# Patient Record
Sex: FEMALE | Race: WHITE | Employment: UNEMPLOYED | URBAN - METROPOLITAN AREA
[De-identification: names, ages, dates, MRNs, and addresses within clinical notes are randomized per-mention and may not be internally consistent; named-entity substitution may affect disease eponyms.]

---

## 2017-09-05 PROCEDURE — 99285 EMERGENCY DEPT VISIT HI MDM: CPT

## 2017-09-05 PROCEDURE — 96361 HYDRATE IV INFUSION ADD-ON: CPT

## 2017-09-05 PROCEDURE — 96360 HYDRATION IV INFUSION INIT: CPT

## 2017-09-06 ENCOUNTER — APPOINTMENT (OUTPATIENT)
Dept: CT IMAGING | Age: 22
End: 2017-09-06
Attending: EMERGENCY MEDICINE
Payer: COMMERCIAL

## 2017-09-06 ENCOUNTER — HOSPITAL ENCOUNTER (EMERGENCY)
Age: 22
Discharge: HOME | End: 2017-09-06
Attending: EMERGENCY MEDICINE | Admitting: EMERGENCY MEDICINE
Payer: COMMERCIAL

## 2017-09-06 VITALS
HEIGHT: 60 IN | RESPIRATION RATE: 18 BRPM | HEART RATE: 90 BPM | SYSTOLIC BLOOD PRESSURE: 113 MMHG | OXYGEN SATURATION: 97 % | TEMPERATURE: 98 F | BODY MASS INDEX: 45.27 KG/M2 | DIASTOLIC BLOOD PRESSURE: 67 MMHG | WEIGHT: 230.6 LBS

## 2017-09-06 DIAGNOSIS — R10.2 ABDOMINAL PAIN, SUPRAPUBIC: Primary | ICD-10-CM

## 2017-09-06 LAB
ALBUMIN SERPL-MCNC: 3.1 G/DL (ref 3.5–5)
ALBUMIN/GLOB SERPL: 0.8 {RATIO} (ref 1.1–2.2)
ALP SERPL-CCNC: 75 U/L (ref 45–117)
ALT SERPL-CCNC: 14 U/L (ref 12–78)
ANION GAP SERPL CALC-SCNC: 6 MMOL/L (ref 5–15)
APPEARANCE UR: CLEAR
AST SERPL-CCNC: 10 U/L (ref 15–37)
BACTERIA URNS QL MICRO: NEGATIVE /HPF
BASOPHILS # BLD: 0 K/UL (ref 0–0.1)
BASOPHILS NFR BLD: 0 % (ref 0–1)
BILIRUB SERPL-MCNC: 0.4 MG/DL (ref 0.2–1)
BILIRUB UR QL: NEGATIVE
BUN SERPL-MCNC: 9 MG/DL (ref 6–20)
BUN/CREAT SERPL: 15 (ref 12–20)
CALCIUM SERPL-MCNC: 8.3 MG/DL (ref 8.5–10.1)
CAOX CRY URNS QL MICRO: ABNORMAL
CHLORIDE SERPL-SCNC: 106 MMOL/L (ref 97–108)
CLUE CELLS VAG QL WET PREP: NORMAL
CO2 SERPL-SCNC: 24 MMOL/L (ref 21–32)
COLOR UR: ABNORMAL
CREAT SERPL-MCNC: 0.62 MG/DL (ref 0.55–1.02)
EOSINOPHIL # BLD: 0.2 K/UL (ref 0–0.4)
EOSINOPHIL NFR BLD: 1 % (ref 0–7)
EPITH CASTS URNS QL MICRO: ABNORMAL /LPF
ERYTHROCYTE [DISTWIDTH] IN BLOOD BY AUTOMATED COUNT: 14.1 % (ref 11.5–14.5)
GLOBULIN SER CALC-MCNC: 3.8 G/DL (ref 2–4)
GLUCOSE SERPL-MCNC: 88 MG/DL (ref 65–100)
GLUCOSE UR STRIP.AUTO-MCNC: NEGATIVE MG/DL
HCG UR QL: NEGATIVE
HCT VFR BLD AUTO: 39.2 % (ref 35–47)
HGB BLD-MCNC: 13.2 G/DL (ref 11.5–16)
HGB UR QL STRIP: NEGATIVE
HYALINE CASTS URNS QL MICRO: ABNORMAL /LPF (ref 0–5)
KETONES UR QL STRIP.AUTO: NEGATIVE MG/DL
KOH PREP SPEC: NORMAL
LEUKOCYTE ESTERASE UR QL STRIP.AUTO: NEGATIVE
LYMPHOCYTES # BLD: 3 K/UL (ref 0.8–3.5)
LYMPHOCYTES NFR BLD: 22 % (ref 12–49)
MCH RBC QN AUTO: 28.5 PG (ref 26–34)
MCHC RBC AUTO-ENTMCNC: 33.7 G/DL (ref 30–36.5)
MCV RBC AUTO: 84.7 FL (ref 80–99)
MONOCYTES # BLD: 0.9 K/UL (ref 0–1)
MONOCYTES NFR BLD: 6 % (ref 5–13)
NEUTS SEG # BLD: 9.5 K/UL (ref 1.8–8)
NEUTS SEG NFR BLD: 71 % (ref 32–75)
NITRITE UR QL STRIP.AUTO: NEGATIVE
PH UR STRIP: 5.5 [PH] (ref 5–8)
PLATELET # BLD AUTO: 298 K/UL (ref 150–400)
POTASSIUM SERPL-SCNC: 3.5 MMOL/L (ref 3.5–5.1)
PROT SERPL-MCNC: 6.9 G/DL (ref 6.4–8.2)
PROT UR STRIP-MCNC: NEGATIVE MG/DL
RBC # BLD AUTO: 4.63 M/UL (ref 3.8–5.2)
RBC #/AREA URNS HPF: ABNORMAL /HPF (ref 0–5)
SERVICE CMNT-IMP: NORMAL
SODIUM SERPL-SCNC: 136 MMOL/L (ref 136–145)
SP GR UR REFRACTOMETRY: 1.03 (ref 1–1.03)
T VAGINALIS VAG QL WET PREP: NORMAL
UA: UC IF INDICATED,UAUC: ABNORMAL
UROBILINOGEN UR QL STRIP.AUTO: 0.2 EU/DL (ref 0.2–1)
WBC # BLD AUTO: 13.6 K/UL (ref 3.6–11)
WBC URNS QL MICRO: ABNORMAL /HPF (ref 0–4)

## 2017-09-06 PROCEDURE — 36415 COLL VENOUS BLD VENIPUNCTURE: CPT | Performed by: EMERGENCY MEDICINE

## 2017-09-06 PROCEDURE — 81001 URINALYSIS AUTO W/SCOPE: CPT | Performed by: EMERGENCY MEDICINE

## 2017-09-06 PROCEDURE — 87491 CHLMYD TRACH DNA AMP PROBE: CPT | Performed by: EMERGENCY MEDICINE

## 2017-09-06 PROCEDURE — 74177 CT ABD & PELVIS W/CONTRAST: CPT

## 2017-09-06 PROCEDURE — 87210 SMEAR WET MOUNT SALINE/INK: CPT | Performed by: EMERGENCY MEDICINE

## 2017-09-06 PROCEDURE — 74011636320 HC RX REV CODE- 636/320: Performed by: EMERGENCY MEDICINE

## 2017-09-06 PROCEDURE — 80053 COMPREHEN METABOLIC PANEL: CPT | Performed by: EMERGENCY MEDICINE

## 2017-09-06 PROCEDURE — 81025 URINE PREGNANCY TEST: CPT | Performed by: EMERGENCY MEDICINE

## 2017-09-06 PROCEDURE — 74011250636 HC RX REV CODE- 250/636: Performed by: EMERGENCY MEDICINE

## 2017-09-06 PROCEDURE — 85025 COMPLETE CBC W/AUTO DIFF WBC: CPT | Performed by: EMERGENCY MEDICINE

## 2017-09-06 RX ORDER — HYDROCODONE BITARTRATE AND ACETAMINOPHEN 5; 325 MG/1; MG/1
1 TABLET ORAL
Qty: 20 TAB | Refills: 0 | Status: SHIPPED | OUTPATIENT
Start: 2017-09-06 | End: 2019-05-22

## 2017-09-06 RX ORDER — SODIUM CHLORIDE 0.9 % (FLUSH) 0.9 %
5-10 SYRINGE (ML) INJECTION EVERY 8 HOURS
Status: DISCONTINUED | OUTPATIENT
Start: 2017-09-06 | End: 2017-09-06 | Stop reason: HOSPADM

## 2017-09-06 RX ORDER — SODIUM CHLORIDE 0.9 % (FLUSH) 0.9 %
10 SYRINGE (ML) INJECTION
Status: COMPLETED | OUTPATIENT
Start: 2017-09-06 | End: 2017-09-06

## 2017-09-06 RX ORDER — SODIUM CHLORIDE 9 MG/ML
50 INJECTION, SOLUTION INTRAVENOUS
Status: COMPLETED | OUTPATIENT
Start: 2017-09-06 | End: 2017-09-06

## 2017-09-06 RX ORDER — ONDANSETRON 4 MG/1
4 TABLET, ORALLY DISINTEGRATING ORAL
Qty: 10 TAB | Refills: 0 | Status: SHIPPED | OUTPATIENT
Start: 2017-09-06 | End: 2019-05-22

## 2017-09-06 RX ORDER — SODIUM CHLORIDE 0.9 % (FLUSH) 0.9 %
5-10 SYRINGE (ML) INJECTION AS NEEDED
Status: DISCONTINUED | OUTPATIENT
Start: 2017-09-06 | End: 2017-09-06 | Stop reason: HOSPADM

## 2017-09-06 RX ADMIN — SODIUM CHLORIDE 1000 ML: 900 INJECTION, SOLUTION INTRAVENOUS at 02:50

## 2017-09-06 RX ADMIN — Medication 10 ML: at 03:50

## 2017-09-06 RX ADMIN — Medication 10 ML: at 03:25

## 2017-09-06 RX ADMIN — IOPAMIDOL 100 ML: 755 INJECTION, SOLUTION INTRAVENOUS at 03:51

## 2017-09-06 RX ADMIN — SODIUM CHLORIDE 50 ML/HR: 900 INJECTION, SOLUTION INTRAVENOUS at 03:50

## 2017-09-06 NOTE — ED NOTES
Discharge instructions reviewed with pt and copy given along with RX by ER MD Elise Torres. Patient ambulatory from ED accompanied by parent in no sign of distress or discomfort.

## 2017-09-06 NOTE — ED TRIAGE NOTES
Patient arrives ambulatory to ED with complaint of lower abdominal/pelvic pain/cramping that started around 10pm last night, \"constant, sharp pain\" that has eased off. Patient denies N/V/D.

## 2017-09-06 NOTE — ED PROVIDER NOTES
HPI Comments: Kvng Salazar is a 25 y.o. female, pmhx migraines / anxiety / depression, who presents ambulatory to the ED c/o sudden onset suprapubic abd pain x 2200 this evening. Pt reports additional vaginal discharge and states she was nauseated over the last 2 days. She notes her LNMP was 8/14/2017. Pt states her last BM was earlier this morning and reportedly normal. She denies any hx of similar sxs. Pt denies any recent medications for her current sxs. She specifically denies any recent fever, chills, vomiting, diarrhea, CP, SOB, lightheadedness, dizziness, numbness, weakness, tingling, BLE swelling, HA, heart palpitations, urinary sxs, changes in BM, changes in PO intake, melena, hematochezia, cough, or congestion. PCP: Deleta Prader  OB/GYN: [facility])    Allergies: NKDA  PMHx: Significant for migraine, anxiety, depression, hypothyroidism, asthma  PSHx: Significant for D&C  Social Hx: +tobacco (1 ppd), +EtOH (occasionally), -Illicit Drugs    There are no other complaints, changes, or physical findings at this time. The history is provided by the patient. Past Medical History:   Diagnosis Date    Anxiety and depression     Asthma     H/O seasonal allergies     History of migraine     Hypothyroidism     Dr Shirley Watt       Past Surgical History:   Procedure Laterality Date   Reina Guzman GYN  07/2015    D & C         History reviewed. No pertinent family history. Social History     Social History    Marital status: SINGLE     Spouse name: N/A    Number of children: N/A    Years of education: N/A     Occupational History    Not on file.      Social History Main Topics    Smoking status: Current Every Day Smoker     Packs/day: 1.00     Years: 3.00    Smokeless tobacco: Never Used    Alcohol use Yes      Comment: occassional    Drug use: No    Sexual activity: Not on file     Other Topics Concern    Not on file     Social History Narrative         ALLERGIES: Review of patient's allergies indicates no known allergies. Review of Systems   Constitutional: Negative for chills and fever. HENT: Negative for congestion, ear pain, rhinorrhea and sore throat. Respiratory: Negative for cough and shortness of breath. Cardiovascular: Negative for chest pain, palpitations and leg swelling. Gastrointestinal: Positive for abdominal pain and nausea. Negative for constipation, diarrhea and vomiting. No melena  No hematochezia   Endocrine: Negative for polyuria. Genitourinary: Positive for vaginal discharge. Negative for dysuria, frequency and hematuria. Neurological: Negative for dizziness, weakness, light-headedness, numbness and headaches. No tingling   All other systems reviewed and are negative. Vitals:    09/06/17 0215 09/06/17 0230 09/06/17 0245 09/06/17 0330   BP: 126/78 126/80 108/71 113/67   Pulse:       Resp:       Temp:       SpO2: 94% 98% 95% 97%   Weight:       Height:                Physical Exam   Nursing note and vitals reviewed.   General appearance - obese, well appearing, and in no distress  Eyes - pupils equal and reactive, extraocular eye movements intact  ENT - mucous membranes moist, pharynx normal without lesions  Neck - supple, no significant adenopathy; non-tender to palpation  Chest - clear to auscultation, no wheezes, rales or rhonchi; non-tender to palpation  Heart - normal rate and regular rhythm, S1 and S2 normal, no murmurs noted  Abdomen - soft, suprapubic tenderness to palpation, nondistended, no masses or organomegaly  Musculoskeletal - no joint tenderness, deformity or swelling; normal ROM  Extremities - peripheral pulses normal, no pedal edema  Skin - normal coloration and turgor, no rashes  Neurological - alert, oriented x3, normal speech, no focal findings or movement disorder noted   - uterine tenderness to palpation, otherwise normal pelvic exam  Written by MONTY Stacy, as dictated by April N Eryn Maria MD      [facility]  Number of Diagnoses or Management Options  Diagnosis management comments:   DDx: UTI, pyelonephritis, PID       Amount and/or Complexity of Data Reviewed  Clinical lab tests: ordered and reviewed  Tests in the radiology section of CPT®: reviewed and ordered  Review and summarize past medical records: yes  Independent visualization of images, tracings, or specimens: yes    Patient Progress  Patient progress: stable      Procedures    Procedure Note - Pelvic Exam:    2:28 AM  Performed by: Aracelis Ahmadi MD  Chaperoned by: Henry Fracnois RN  Pelvic exam was performed using bimanual and speculum. Further findings noted in physical exam.   The procedure took 1-15 minutes, and pt tolerated well. Written by Geovanni Aj, ED Scribe, as dictated by Patsy Esparza MD    Progress note:  5:02 AM  Pt noted to be feeling better, she states her pain is significantly improved, ready for discharge. Updated pt and/or family on all final lab and imaging findings. Will follow up as instructed. All questions have been answered, pt voiced understanding and agreement with plan. Narcotics were prescribed, pt was advised not to drive or operate heavy machinery. Pt defers empiric treatment for GC / Chlamydia at this time. Specific return precautions provided as well as instructions to return to the ED should sx worsen at any time. Vital signs stable for discharge.    Written by Geovanni Aj, ED Scribe, as dictated by Patsy Heard MD    LABORATORY TESTS:  Recent Results (from the past 12 hour(s))   URINALYSIS W/ REFLEX CULTURE    Collection Time: 09/06/17 12:06 AM   Result Value Ref Range    Color YELLOW/STRAW      Appearance CLEAR CLEAR      Specific gravity 1.026 1.003 - 1.030      pH (UA) 5.5 5.0 - 8.0      Protein NEGATIVE  NEG mg/dL    Glucose NEGATIVE  NEG mg/dL    Ketone NEGATIVE  NEG mg/dL    Bilirubin NEGATIVE  NEG      Blood NEGATIVE  NEG      Urobilinogen 0.2 0.2 - 1.0 EU/dL    Nitrites NEGATIVE  NEG      Leukocyte Esterase NEGATIVE  NEG      WBC 0-4 0 - 4 /hpf    RBC 0-5 0 - 5 /hpf    Epithelial cells FEW FEW /lpf    Bacteria NEGATIVE  NEG /hpf    UA:UC IF INDICATED CULTURE NOT INDICATED BY UA RESULT CNI      CA Oxalate crystals FEW (A) NEG      Hyaline cast 0-2 0 - 5 /lpf   HCG URINE, QL    Collection Time: 09/06/17 12:06 AM   Result Value Ref Range    HCG urine, Ql. NEGATIVE  NEG     CBC WITH AUTOMATED DIFF    Collection Time: 09/06/17  2:18 AM   Result Value Ref Range    WBC 13.6 (H) 3.6 - 11.0 K/uL    RBC 4.63 3.80 - 5.20 M/uL    HGB 13.2 11.5 - 16.0 g/dL    HCT 39.2 35.0 - 47.0 %    MCV 84.7 80.0 - 99.0 FL    MCH 28.5 26.0 - 34.0 PG    MCHC 33.7 30.0 - 36.5 g/dL    RDW 14.1 11.5 - 14.5 %    PLATELET 398 447 - 494 K/uL    NEUTROPHILS 71 32 - 75 %    LYMPHOCYTES 22 12 - 49 %    MONOCYTES 6 5 - 13 %    EOSINOPHILS 1 0 - 7 %    BASOPHILS 0 0 - 1 %    ABS. NEUTROPHILS 9.5 (H) 1.8 - 8.0 K/UL    ABS. LYMPHOCYTES 3.0 0.8 - 3.5 K/UL    ABS. MONOCYTES 0.9 0.0 - 1.0 K/UL    ABS. EOSINOPHILS 0.2 0.0 - 0.4 K/UL    ABS. BASOPHILS 0.0 0.0 - 0.1 K/UL   METABOLIC PANEL, COMPREHENSIVE    Collection Time: 09/06/17  2:18 AM   Result Value Ref Range    Sodium 136 136 - 145 mmol/L    Potassium 3.5 3.5 - 5.1 mmol/L    Chloride 106 97 - 108 mmol/L    CO2 24 21 - 32 mmol/L    Anion gap 6 5 - 15 mmol/L    Glucose 88 65 - 100 mg/dL    BUN 9 6 - 20 MG/DL    Creatinine 0.62 0.55 - 1.02 MG/DL    BUN/Creatinine ratio 15 12 - 20      GFR est AA >60 >60 ml/min/1.73m2    GFR est non-AA >60 >60 ml/min/1.73m2    Calcium 8.3 (L) 8.5 - 10.1 MG/DL    Bilirubin, total 0.4 0.2 - 1.0 MG/DL    ALT (SGPT) 14 12 - 78 U/L    AST (SGOT) 10 (L) 15 - 37 U/L    Alk.  phosphatase 75 45 - 117 U/L    Protein, total 6.9 6.4 - 8.2 g/dL    Albumin 3.1 (L) 3.5 - 5.0 g/dL    Globulin 3.8 2.0 - 4.0 g/dL    A-G Ratio 0.8 (L) 1.1 - 2.2     KOH, OTHER SOURCES    Collection Time: 09/06/17  2:18 AM   Result Value Ref Range    Special Requests: NO SPECIAL REQUESTS      KOH NO YEAST SEEN     WET PREP    Collection Time: 09/06/17  2:18 AM   Result Value Ref Range    Clue cells CLUE CELLS ABSENT      Wet prep NO TRICHOMONAS SEEN         IMAGING RESULTS:     CT Results  (Last 48 hours)               09/06/17 0400  CT ABD PELV W CONT Final result    Impression:  IMPRESSION:   No acute intraperitoneal process. Narrative:  EXAM:  CT ABD PELV W CONT   Clinical history: Lower abdominal pain, increased vaginal discharge, nausea for   the past 2 days   INDICATION: lower abd pain  suprapubic pain, history of hypothyroidism and   migraines       COMPARISON: 9/13/2015       CONTRAST:  100 mL of Isovue-370. TECHNIQUE:    Following the uneventful intravenous administration of contrast, thin axial   images were obtained through the abdomen and pelvis. Coronal and sagittal   reconstructions were generated. Oral contrast was not administered. CT dose   reduction was achieved through use of a standardized protocol tailored for this   examination and automatic exposure control for dose modulation. FINDINGS:    LUNG BASES: Clear. INCIDENTALLY IMAGED HEART AND MEDIASTINUM: Unremarkable. LIVER: No mass or biliary dilatation. GALLBLADDER: Unremarkable. SPLEEN: No mass. PANCREAS: No mass or ductal dilatation. ADRENALS: Unremarkable. KIDNEYS: No mass, calculus, or hydronephrosis. STOMACH: Unremarkable. SMALL BOWEL: No dilatation or wall thickening. COLON: Few colonic diverticula. No diverticulitis   APPENDIX: Unremarkable. PERITONEUM: No ascites or pneumoperitoneum. RETROPERITONEUM: No lymphadenopathy or aortic aneurysm. REPRODUCTIVE ORGANS: Within normal limits   URINARY BLADDER: No mass or calculus. BONES: No destructive bone lesion.    ADDITIONAL COMMENTS: N/A                    MEDICATIONS GIVEN:  Medications   sodium chloride (NS) flush 5-10 mL (10 mL IntraVENous Given 9/6/17 0325)   sodium chloride (NS) flush 5-10 mL (not administered) sodium chloride 0.9 % bolus infusion 1,000 mL (1,000 mL IntraVENous New Bag 9/6/17 0250)   sodium chloride (NS) flush 10 mL (10 mL IntraVENous Given 9/6/17 0350)   iopamidol (ISOVUE-370) 76 % injection 100 mL (100 mL IntraVENous Given 9/6/17 0351)   0.9% sodium chloride infusion (50 mL/hr IntraVENous New Bag 9/6/17 0350)       IMPRESSION:  1. Abdominal pain, suprapubic        PLAN:  1. Current Discharge Medication List      START taking these medications    Details   HYDROcodone-acetaminophen (NORCO) 5-325 mg per tablet Take 1 Tab by mouth every four (4) hours as needed for Pain. Max Daily Amount: 6 Tabs. Qty: 20 Tab, Refills: 0      ondansetron (ZOFRAN ODT) 4 mg disintegrating tablet Take 1 Tab by mouth every eight (8) hours as needed for Nausea. Qty: 10 Tab, Refills: 0           2. Follow-up Information     Follow up With Details Comments Contact Info    [facility] EMERGENCY DEPT  If symptoms worsen [address] [address]  [address] In 2 days  [facility]  Hakeem García  [phone]          Return to ED if worse     DISCHARGE NOTE:  5:02 AM  The patient's results have been reviewed with family and/or caregiver. They verbally convey their understanding and agreement of the patient's signs, symptoms, diagnosis, treatment, and prognosis. They additionally agree to follow up as recommended in the discharge instructions or to return to the Emergency Room should the patient's condition change prior to their follow-up appointment. The family and/or caregiver verbally agrees with the care-plan and all of their questions have been answered. The discharge instructions have also been provided to the them along with educational information regarding the patient's diagnosis and a list of reasons why the patient would want to return to the ER prior to their follow-up appointment should their condition change.   Written by MONTY Prescott, as dictated by April Roberto Gonzales MD.          This note is prepared by Marshal Piper, acting as Scribe for MD Patsy Talbot MD : The scribe's documentation has been prepared under my direction and personally reviewed by me in its entirety. I confirm that the note above accurately reflects all work, treatment, procedures, and medical decision making performed by me. This note will not be viewable in [address]

## 2017-09-06 NOTE — DISCHARGE INSTRUCTIONS
Pelvic Pain: Care Instructions  Your Care Instructions    Pelvic pain, or pain in the lower belly, can have many causes. Often pelvic pain is not serious and gets better in a few days. If your pain continues or gets worse, you may need tests and treatment. Tell your doctor about any new symptoms. These may be signs of a serious problem. Follow-up care is a key part of your treatment and safety. Be sure to make and go to all appointments, and call your doctor if you are having problems. It's also a good idea to know your test results and keep a list of the medicines you take. How can you care for yourself at home? · Rest until you feel better. Lie down, and raise your legs by placing a pillow under your knees. · Drink plenty of fluids. You may find that small, frequent sips are easier on your stomach than if you drink a lot at once. Avoid drinks with carbonation or caffeine, such as soda pop, tea, or coffee. · Try eating several small meals instead of 2 or 3 large ones. Eat mild foods, such as rice, dry toast or crackers, bananas, and applesauce. Avoid fatty and spicy foods, other fruits, and alcohol until 48 hours after your symptoms have gone away. · Take an over-the-counter pain medicine, such as acetaminophen (Tylenol), ibuprofen (Advil, Motrin), or naproxen (Aleve). Read and follow all instructions on the label. · Do not take two or more pain medicines at the same time unless the doctor told you to. Many pain medicines have acetaminophen, which is Tylenol. Too much acetaminophen (Tylenol) can be harmful. · You can put a heating pad, a warm cloth, or moist heat on your belly to relieve pain. When should you call for help? Call 911 anytime you think you may need emergency care. For example, call if:  · You passed out (lost consciousness). Call your doctor now or seek immediate medical care if:  · Your pain is getting worse. · Your pain becomes focused in one area of your belly.   · You have severe vaginal bleeding. Severe means that you are soaking through your usual pads or tampons every hour for 2 or more hours and passing clots of blood. · You have new symptoms such as fever, urinary problems or unexpected vaginal bleeding. · You are dizzy or lightheaded, or you feel like you may faint. Watch closely for changes in your health, and be sure to contact your doctor if:  · You do not get better as expected. Where can you learn more? Go to http://sofia-skye.info/. Enter [id number] in the search box to learn more about \"Pelvic Pain: Care Instructions. \"  Current as of: October 13, 2016  Content Version: 11.3  © 6120-8528 [employer]. Care instructions adapted under license by [employer] (which disclaims liability or warranty for this information). If you have questions about a medical condition or this instruction, always ask your healthcare professional. [address] any warranty or liability for your use of this information.

## 2017-09-08 ENCOUNTER — HOSPITAL ENCOUNTER (EMERGENCY)
Age: 22
Discharge: HOME | End: 2017-09-08
Attending: EMERGENCY MEDICINE | Admitting: EMERGENCY MEDICINE
Payer: COMMERCIAL

## 2017-09-08 VITALS
RESPIRATION RATE: 18 BRPM | BODY MASS INDEX: 45.27 KG/M2 | SYSTOLIC BLOOD PRESSURE: 150 MMHG | HEIGHT: 60 IN | WEIGHT: 230.6 LBS | TEMPERATURE: 98.3 F | HEART RATE: 93 BPM | OXYGEN SATURATION: 97 % | DIASTOLIC BLOOD PRESSURE: 90 MMHG

## 2017-09-08 DIAGNOSIS — N72 CERVICITIS: Primary | ICD-10-CM

## 2017-09-08 LAB
C TRACH DNA SPEC QL NAA+PROBE: POSITIVE
N GONORRHOEA DNA SPEC QL NAA+PROBE: POSITIVE
SAMPLE TYPE: ABNORMAL
SERVICE CMNT-IMP: ABNORMAL
SPECIMEN SOURCE: ABNORMAL

## 2017-09-08 PROCEDURE — 99283 EMERGENCY DEPT VISIT LOW MDM: CPT

## 2017-09-08 PROCEDURE — 74011250637 HC RX REV CODE- 250/637: Performed by: EMERGENCY MEDICINE

## 2017-09-08 PROCEDURE — 96372 THER/PROPH/DIAG INJ SC/IM: CPT

## 2017-09-08 PROCEDURE — 74011000250 HC RX REV CODE- 250: Performed by: PHYSICIAN ASSISTANT

## 2017-09-08 PROCEDURE — 74011250636 HC RX REV CODE- 250/636: Performed by: PHYSICIAN ASSISTANT

## 2017-09-08 RX ORDER — AZITHROMYCIN 250 MG/1
1000 TABLET, FILM COATED ORAL
Status: DISCONTINUED | OUTPATIENT
Start: 2017-09-08 | End: 2017-09-08

## 2017-09-08 RX ORDER — DOXYCYCLINE HYCLATE 100 MG
100 TABLET ORAL
Status: COMPLETED | OUTPATIENT
Start: 2017-09-08 | End: 2017-09-08

## 2017-09-08 RX ORDER — DOXYCYCLINE HYCLATE 100 MG
100 TABLET ORAL 2 TIMES DAILY
Qty: 14 TAB | Refills: 0 | Status: SHIPPED | OUTPATIENT
Start: 2017-09-08 | End: 2017-09-15

## 2017-09-08 RX ADMIN — LIDOCAINE HYDROCHLORIDE 250 MG: 10 INJECTION, SOLUTION EPIDURAL; INFILTRATION; INTRACAUDAL; PERINEURAL at 21:22

## 2017-09-08 RX ADMIN — DOXYCYCLINE HYCLATE 100 MG: 100 TABLET, COATED ORAL at 21:21

## 2017-09-08 NOTE — PROGRESS NOTES
Provider contacted patient, verified . Discussed culture results. Patient was not treated in ED, advised to return to ED for treatment. Also advised to notify sexual partner(s) so that they may be treated. Follow up in 1-2 weeks with gyn or health dept to be retested to assure resolution.    Missy Ho

## 2017-09-09 NOTE — DISCHARGE INSTRUCTIONS
Cervicitis: Care Instructions  Your Care Instructions    Cervicitis means that your cervix is inflamed. The cervix is the part of your uterus that opens into your vagina. This problem is most often caused by an infection. Some women get it after they have a sexually transmitted infection (STI). These include gonorrhea and chlamydia. It can also be caused by irritation from some types of birth control. Two examples are the cervical cap or diaphragm. Your doctor may do some tests to help find the cause of the problem. It is very important to treat cervicitis. If you don't, you could have serious health problems. For this reason, you may need a test after your treatment to make sure the infection is gone. Follow-up care is a key part of your treatment and safety. Be sure to make and go to all appointments, and call your doctor if you are having problems. It's also a good idea to know your test results and keep a list of the medicines you take. How can you care for yourself at home? · Take your antibiotics as directed. Do not stop taking them just because you feel better. You need to take the full course of antibiotics. · If your doctor prescribed antifungal medicine, use it as directed. · While you are being treated, do not have sex. If your treatment is one dose of antibiotics, wait at least 7 days after you take your medicine before you have any kind of sexual contact. Even if you use a condom, you could get infected again. · It's important to tell your sex partner or partners that you have cervicitis. It may be related to an STI. Any partners should get tested and then treated if they have an STI. This is true even if they don't have symptoms. · Do not douche. It can change the normal balance of substances in your vagina. · Do not use tampons while you are being treated. To prevent STIs  · Use latex condoms every time you have sex. Use them from the start to the end of sexual contact.   · Talk to your partner before you have sex. Find out if he or she has or is at risk for any sexually transmitted infection (STI). Keep in mind that a person may be able to spread an STI even if he or she does not have symptoms. · Do not have sex with anyone who has symptoms of an STI. These include sores on the genitals or mouth. · Having one sex partner (who does not have STIs and does not have sex with anyone else) is a good way to avoid STIs. When should you call for help? Call your doctor now or seek immediate medical care if:  · You have new pelvic pain, or the pain in your pelvis gets worse. · You have a new discharge from your vagina. · You have a new or higher fever. Watch closely for changes in your health, and be sure to contact your doctor if:  · You do not get better as expected. · Your symptoms continue or come back after treatment, or you get new symptoms. Where can you learn more? Go to http://sofia-skye.info/. Enter E405 in the search box to learn more about \"Cervicitis: Care Instructions. \"  Current as of: July 26, 2016  Content Version: 11.3  © 2141-6210 [employer]. Care instructions adapted under license by [employer] (which disclaims liability or warranty for this information). If you have questions about a medical condition or this instruction, always ask your healthcare professional. [address] any warranty or liability for your use of this information.

## 2017-09-09 NOTE — ED PROVIDER NOTES
HPI Comments: Rebeca Khalil is a 25 y.o. female with PMhx significant for anxiety, depression, asthma, hypothyroidism who presents ambulatory to the ED for abnormal lab results. Pt reports that she received a call today regarding her chlamydia and gonorrhea lab results. Per chart review pt was seen at [facility] on 09/06/2017 for suprapubic abdominal pain and vaginal discharge. Per chart pt opted not to be treated at the time of her visit on 09/06/2017. She states that she has had no worsening vaginal discharge or abdominal discomfort since being evaluated on 09/06/2017. Pt denies any dysuria, vaginal bleeding, vaginal pain, abdominal pain, nausea, vomiting, fevers, or chills. Social history significant for: + Tobacco (1ppd), + EtOH, - Illicit drug use    PCP: Michael Almonte    There are no other complaints, changes or physical findings at this time. Written by [employer] [facility] Scribjennifer, as dictated by Raegan Riley DO. The history is provided by the patient. No  was used. Past Medical History:   Diagnosis Date    Anxiety and depression     Asthma     H/O seasonal allergies     History of migraine     Hypothyroidism     Dr Marilin Ruiz       Past Surgical History:   Procedure Laterality Date    HX GYN  07/2015    D & C         No family history on file. Social History     Social History    Marital status: SINGLE     Spouse name: N/A    Number of children: N/A    Years of education: N/A     Occupational History    Not on file. Social History Main Topics    Smoking status: Current Every Day Smoker     Packs/day: 1.00     Years: 3.00    Smokeless tobacco: Never Used    Alcohol use Yes      Comment: occassional    Drug use: No    Sexual activity: Not on file     Other Topics Concern    Not on file     Social History Narrative         ALLERGIES: Review of patient's allergies indicates no known allergies. Review of Systems   Constitutional: Negative.   Negative for appetite change, chills, fatigue and fever. HENT: Negative. Negative for congestion, rhinorrhea, sinus pressure and sore throat. Eyes: Negative. Respiratory: Negative. Negative for cough, choking, chest tightness, shortness of breath and wheezing. Cardiovascular: Negative. Negative for chest pain, palpitations and leg swelling. Gastrointestinal: Negative for abdominal pain, constipation, diarrhea, nausea and vomiting. Endocrine: Negative. Genitourinary: Negative. Negative for difficulty urinating, dysuria, flank pain, urgency, vaginal bleeding, vaginal discharge and vaginal pain. Musculoskeletal: Negative. Skin: Negative. Neurological: Negative. Negative for dizziness, speech difficulty, weakness, light-headedness, numbness and headaches. Psychiatric/Behavioral: Negative. All other systems reviewed and are negative. Patient Vitals for the past 12 hrs:   Temp Pulse Resp BP SpO2   09/08/17 1935 98.1 °F (36.7 °C) 96 18 (!) 153/98 100 %     Physical Exam   Constitutional: She is oriented to person, place, and time. She appears well-developed and well-nourished. No distress. HENT:   Head: Normocephalic and atraumatic. Mouth/Throat: Oropharynx is clear and moist. No oropharyngeal exudate. Eyes: Conjunctivae and EOM are normal. Pupils are equal, round, and reactive to light. Neck: Normal range of motion. Neck supple. No JVD present. No tracheal deviation present. Cardiovascular: Normal rate, regular rhythm, normal heart sounds and intact distal pulses. No murmur heard. Pulmonary/Chest: Effort normal and breath sounds normal. No stridor. No respiratory distress. She has no wheezes. She has no rales. She exhibits no tenderness. Abdominal: Soft. She exhibits no distension. There is no tenderness. There is no rebound and no guarding. Musculoskeletal: Normal range of motion. She exhibits no edema or tenderness.    Neurological: She is alert and oriented to person, place, and time. No cranial nerve deficit. No gross motor or sensory deficits    Skin: Skin is warm and dry. She is not diaphoretic. Psychiatric: She has a normal mood and affect. Her behavior is normal.   Nursing note and vitals reviewed. MDM  Number of Diagnoses or Management Options  Cervicitis:   Diagnosis management comments:   DDx: Cervicitis       Amount and/or Complexity of Data Reviewed  Review and summarize past medical records: yes    Patient Progress  Patient progress: stable    Procedures    MEDICATIONS GIVEN:  Medications   cefTRIAXone (ROCEPHIN) 250 mg in lidocaine (PF) (XYLOCAINE) 10 mg/mL (1 %) IM injection (not administered)   doxycycline (VIBRA-TABS) tablet 100 mg (not administered)       IMPRESSION:  1. Cervicitis        PLAN:  1. Current Discharge Medication List      START taking these medications    Details   doxycycline (VIBRA-TABS) 100 mg tablet Take 1 Tab by mouth two (2) times a day for 7 days. Qty: 14 Tab, Refills: 0           2. Follow-up Information     Follow up With Details [id number] [facility]  As [facility] [id number] [facility]  Hakeem García  [phone]          Return to ED if worse     DISCHARGE NOTE  9:10 PM  The patient has been re-evaluated and is ready for discharge. Reviewed available results with patient. Counseled patient on diagnosis and care plan. Patient has expressed understanding, and all questions have been answered. Patient agrees with plan and agrees to follow up as recommended, or return to the ED if their symptoms worsen. Discharge instructions have been provided and explained to the patient, along with reasons to return to the ED. This note is prepared by Antonella Beard, acting as Scribe for Sujey Bernabe, [facility], DO: The scribe's documentation has been prepared under my direction and personally reviewed by me in its entirety.  I confirm that the note above accurately reflects all work, treatment, procedures, and medical decision making performed by me.

## 2018-01-18 LAB — CREATININE, EXTERNAL: 0.61

## 2019-05-22 ENCOUNTER — OFFICE VISIT (OUTPATIENT)
Dept: ENDOCRINOLOGY | Age: 24
End: 2019-05-22

## 2019-05-22 VITALS
HEIGHT: 60 IN | WEIGHT: 225 LBS | DIASTOLIC BLOOD PRESSURE: 76 MMHG | SYSTOLIC BLOOD PRESSURE: 129 MMHG | BODY MASS INDEX: 44.17 KG/M2 | HEART RATE: 81 BPM

## 2019-05-22 DIAGNOSIS — E06.3 HYPOTHYROIDISM DUE TO HASHIMOTO'S THYROIDITIS: Primary | ICD-10-CM

## 2019-05-22 DIAGNOSIS — E03.8 HYPOTHYROIDISM DUE TO HASHIMOTO'S THYROIDITIS: Primary | ICD-10-CM

## 2019-05-22 DIAGNOSIS — E04.1 THYROID NODULE: ICD-10-CM

## 2019-05-22 RX ORDER — NORGESTIMATE AND ETHINYL ESTRADIOL 0.25-0.035
KIT ORAL
Refills: 0 | COMMUNITY
Start: 2019-05-03

## 2019-05-22 RX ORDER — ERGOCALCIFEROL 1.25 MG/1
50000 CAPSULE ORAL
Refills: 0 | COMMUNITY
Start: 2019-04-14

## 2019-05-22 RX ORDER — AMOXICILLIN AND CLAVULANATE POTASSIUM 875; 125 MG/1; MG/1
TABLET, FILM COATED ORAL
Refills: 0 | COMMUNITY
Start: 2019-05-10

## 2019-05-22 RX ORDER — CITALOPRAM 20 MG/1
TABLET, FILM COATED ORAL DAILY
COMMUNITY

## 2019-05-22 NOTE — PROGRESS NOTES
CONSULTATION REQUESTED BY: Thor Rodriguez MD     REASON FOR CONSULT: Hypothyroidism / thyroid nodule    CHIEF COMPLAINT: Evaluation for hypothyroidism    HISTORY OF PRESENT ILLNESS:   Shaheed Parra is a 22 y.o. female with a PMHx as noted below who was referred to our endocrinology clinic for evaluation of hypothyroidism and thyroid nodule    Hypothyroidism:  Diagnosed with hashimoto thyroiditis around 2016, been seen by Dr. Margaret Anders in the past,  Patient denies history of radiation exposure or trauma/surgery,  Family history of thyroid disease is positive in her mother,   Currently taking 50mcg of generic levothyroxine, prev on 75 mcg,   Had actually stopped taking it around 2017 and then restarted it a few weeks ago,   She just \"jessica quit taking it\",  The patient admits to taking with her other med in the AM and not waiting before eating breakfast.   Reports she was not aware of how to take this medicine correctly. Patient reports only rare palpitations, and some tremors. Weight has been steady    Thyroid Nodule:   Reports an Ultrasound about 3 years ago or so, record not available,  A diagnosis of thyroid nodule is on file,  Patient does not have much knowledge of that,   She denies dysphagia,  No known family hx of thyroid nodules. Outside thyroid levels reviewed:   2/5/19: TSH 1.16    PAST MEDICAL/SURGICAL HISTORY:   Past Medical History:   Diagnosis Date    Anxiety and depression     Asthma     H/O seasonal allergies     History of migraine     Hypothyroidism     Dr Anita Auguste     Past Surgical History:   Procedure Laterality Date    HX GYN  07/2015    D & C       ALLERGIES:   Allergies   Allergen Reactions    Latex Rash       MEDICATIONS ON ADMISSION:     Current Outpatient Medications:     citalopram (CELEXA) 20 mg tablet, Take  by mouth daily. , Disp: , Rfl:     amoxicillin-clavulanate (AUGMENTIN) 875-125 mg per tablet, take 1 tablet by mouth every 12 hours for 10 days, Disp: , Rfl: 0   VITAMIN D2 50,000 unit capsule, , Disp: , Rfl: 0    VIJI 0.25-35 mg-mcg tab, take 1 tablet by mouth once daily, Disp: , Rfl: 0    beclomethasone (QVAR) 80 mcg/actuation inhaler, Take 2 Puffs by inhalation two (2) times a day. Indications: BRONCHIAL ASTHMA, Disp: , Rfl:     albuterol (PROAIR HFA) 90 mcg/actuation inhaler, Take 2 Puffs by inhalation every four (4) hours as needed for Wheezing. Indications: ACUTE ASTHMA ATTACK, Disp: , Rfl:     levothyroxine (SYNTHROID) 50 mcg tablet, Take  by mouth Daily (before breakfast).  Indications: HYPOTHYROIDISM, Disp: , Rfl:     SOCIAL HISTORY:   Social History     Socioeconomic History    Marital status: SINGLE     Spouse name: Not on file    Number of children: Not on file    Years of education: Not on file    Highest education level: Not on file   Occupational History    Not on file   Social Needs    Financial resource strain: Not on file    Food insecurity:     Worry: Not on file     Inability: Not on file    Transportation needs:     Medical: Not on file     Non-medical: Not on file   Tobacco Use    Smoking status: Current Every Day Smoker     Packs/day: 1.00     Years: 3.00     Pack years: 3.00    Smokeless tobacco: Never Used   Substance and Sexual Activity    Alcohol use: Yes     Comment: occassional    Drug use: No    Sexual activity: Not on file   Lifestyle    Physical activity:     Days per week: Not on file     Minutes per session: Not on file    Stress: Not on file   Relationships    Social connections:     Talks on phone: Not on file     Gets together: Not on file     Attends Nondenominational service: Not on file     Active member of club or organization: Not on file     Attends meetings of clubs or organizations: Not on file     Relationship status: Not on file    Intimate partner violence:     Fear of current or ex partner: Not on file     Emotionally abused: Not on file     Physically abused: Not on file     Forced sexual activity: Not on file Other Topics Concern    Not on file   Social History Narrative    Not on file       FAMILY HISTORY:  Family History   Problem Relation Age of Onset   Deluca Arthritis-osteo Mother     Migraines Mother     Stroke Mother     Elevated Lipids Father     Alcohol abuse Maternal Uncle     Diabetes Maternal Grandmother     Cancer Paternal Grandmother        REVIEW OF SYSTEMS: Complete ROS assessed and noted for that which is described above, all else are negative. Eyes: normal  ENT: normal  CVS: normal  Resp: normal  GI: normal  : normal  GYN: normal  Endocrine: normal  Integument: normal  Musculoskeletal: normal  Neuro: normal  Psych: normal      PHYSICAL EXAMINATION:    VITAL SIGNS:  Visit Vitals  /76 (BP 1 Location: Left arm, BP Patient Position: Sitting)   Pulse 81   Ht 5' (1.524 m)   Wt 225 lb (102.1 kg)   BMI 43.94 kg/m²       GENERAL: NCAT, Sitting comfortably, NAD  EYES: EOMI, non-icteric, no proptosis  Ear/Nose/Throat: NCAT, no inflammation, no masses, thyroid gland is not appreciably enlarged  LYMPH NODES: No LAD  CARDIOVASCULAR: S1 S2, RRR, No murmur, 2+ radial pulses  RESPIRATORY: CTA b/l, no wheeze/rales  GASTROINTESTINAL: ND  MUSCULOSKELETAL: Normal ROM, no atrophy  SKIN: warm, no edema/rash/ or other skin changes  NEUROLOGIC: 5/5 power all extremities, no tremor, AAOx3  PSYCHIATRIC: Normal affect, Normal insight and judgement       REVIEW OF LABORATORY AND RADIOLOGY DATA:   Labs and documentation have been reviewed as described above. ASSESSMENT AND PLAN:   Mere Wagner is a 22 y.o. female with a PMHx as noted above who was referred to our endocrinology clinic for evaluation of hypothyroidism and thyroid nodule    Hypothyroidism  Thyroid nodule    Patient has not been taking thyroid tablets correctly, discussed proper way to take, and the importance of this in order to have accurate dose-level response.  She is aware that taking it incorrectly produces spurious levels that result in frequent and often unnecessary dose changes. She will start taking this correctly. We also noted that if her ultrasound was obtained about 3 years ago and not repeated, it would be important to repeat and compare with prior US findings. I have ordered this for her to complete at her convenience prior to next visit, noting she will also bring her previous [address] [facility] [address] record with her as well. Today we will check a TSH & FT4 level to determine if patient is euthyroid on current regimen. Continue with 50 mcg of levothyroxine daily. Discussed the best way to take thyroid hormone each morning. Plan to RTC in 3 months with prelabs and thyroid ultrsaound. Delia Huerta.  [address] [facility]

## 2019-05-22 NOTE — PATIENT INSTRUCTIONS
PLEASE READ THESE INSTRUCTIONS:     1. Plan to take 50 mcg of levothyroxine each morning. 2. Remember to take levothyroxine with a glass of water only, on an empty stomach each morning, 1 hour prior to ingesting any other medications, including vitamins, food, coffee, tea, juice or any other beverages. All of these can reduce the absorption of thyroid hormone tablets and result in fluctuating levels in the blood and on labs, affecting also how one feels. 3. We will plan for you to return to clinic in 3 months for re-evaluation,    4. I have provided you with a lab order sheet so you can have your labs repeated 2 days before your next visit. This way we can have the results available to us in time for your visit so we can make the best decisions at that time. 5. If you are able, you can have your thyroid ultrasound completed near home along with your labs 3-7 days before your next visit. Dixon Temple.  [address] [facility]  [address] [place] [address]

## 2019-05-23 ENCOUNTER — PATIENT MESSAGE (OUTPATIENT)
Dept: ENDOCRINOLOGY | Age: 24
End: 2019-05-23

## 2019-05-23 LAB
T4 FREE SERPL-MCNC: 1.28 NG/DL (ref 0.82–1.77)
TSH SERPL DL<=0.005 MIU/L-ACNC: 2.4 UIU/ML (ref 0.45–4.5)

## 2019-05-23 NOTE — PROGRESS NOTES
Stable thyroid levels, notified through De GOLDMAN  [address] [facility]  [address] [facility] [place]

## 2019-08-01 ENCOUNTER — HOSPITAL ENCOUNTER (OUTPATIENT)
Dept: ULTRASOUND IMAGING | Age: 24
Discharge: HOME | End: 2019-08-01
Attending: INTERNAL MEDICINE
Payer: COMMERCIAL

## 2019-08-01 DIAGNOSIS — E04.1 THYROID NODULE: ICD-10-CM

## 2019-08-01 DIAGNOSIS — E03.8 HYPOTHYROIDISM DUE TO HASHIMOTO'S THYROIDITIS: ICD-10-CM

## 2019-08-01 DIAGNOSIS — E06.3 HYPOTHYROIDISM DUE TO HASHIMOTO'S THYROIDITIS: ICD-10-CM

## 2019-08-01 PROCEDURE — 76536 US EXAM OF HEAD AND NECK: CPT

## 2019-08-15 LAB
CREATININE, EXTERNAL: 0.56
HBA1C MFR BLD HPLC: 5.3 %

## 2019-08-27 ENCOUNTER — OFFICE VISIT (OUTPATIENT)
Dept: ENDOCRINOLOGY | Age: 24
End: 2019-08-27

## 2019-08-27 VITALS
WEIGHT: 235 LBS | HEIGHT: 60 IN | BODY MASS INDEX: 46.13 KG/M2 | SYSTOLIC BLOOD PRESSURE: 145 MMHG | DIASTOLIC BLOOD PRESSURE: 88 MMHG | HEART RATE: 90 BPM

## 2019-08-27 DIAGNOSIS — E06.3 HYPOTHYROIDISM DUE TO HASHIMOTO'S THYROIDITIS: Primary | ICD-10-CM

## 2019-08-27 DIAGNOSIS — E04.1 THYROID NODULE: ICD-10-CM

## 2019-08-27 DIAGNOSIS — E03.8 HYPOTHYROIDISM DUE TO HASHIMOTO'S THYROIDITIS: Primary | ICD-10-CM

## 2019-08-27 NOTE — PROGRESS NOTES
CHIEF COMPLAINT: Evaluation for hypothyroidism    HISTORY OF PRESENT ILLNESS:   Kaden Story is a 22 y.o. female with a PMHx as noted below who presents for f/u evaluation of hypothyroidism and thyroid nodule    Hypothyroidism:  Diagnosed with hashimoto thyroiditis around 2016, been seen by Dr. Gavino Benson in the past,  Patient denies history of radiation exposure or trauma/surgery,  Family history of thyroid disease is positive in her mother,   At initial visit had bee on 50mcg of generic levothyroxine, prev on 75 mcg,   Had actually stopped taking it around 2017 and then restarted it a few weeks before initial visit. She just \"jessica quit taking it\",  The patient was taking with her other med in the AM and not waiting before eating breakfast.   We noted her thyroid level was stable and had her continue 50 mcg daily,  She as been taking it correctly as directed.    Had labs completed outside, will reach out to obtain them,     Thyroid Nodule:   Reported an Ultrasound about 3 years prior to initial visit,  A diagnosis of thyroid nodule was noted to be on file,  8/1/19: Repeat Thyroid US: Left 1.1 x 0.8 x 0.3 cm \"nodule or cyst\",    Outside thyroid levels reviewed:   2/5/19: TSH 1.16    Review of most recent thyroid function:  Lab Results   Component Value Date    TSH 2.400 05/22/2019    FT4 1.28 05/22/2019      Thyroid Lab Key:  TSILT = Thyroid stimulating antibodies  TRALT = TSH Receptor Antibodies  TMCLT = TPO antibodies  T3LT = Total T3 levels  [id number] = Direct FT4  [id number] = Free T3      PAST MEDICAL/SURGICAL HISTORY:   Past Medical History:   Diagnosis Date    Anxiety and depression     Asthma     H/O seasonal allergies     History of migraine     Hypothyroidism     Dr Kaiden Naylor     Past Surgical History:   Procedure Laterality Date    HX GYN  07/2015    D & C       ALLERGIES:   Allergies   Allergen Reactions    Latex Rash       MEDICATIONS ON ADMISSION:     Current Outpatient Medications:     citalopram (CELEXA) 20 mg tablet, Take  by mouth daily. , Disp: , Rfl:     VITAMIN D2 50,000 unit capsule, Take 50,000 Units by mouth every seven (7) days. , Disp: , Rfl: 0    VIJI 0.25-35 mg-mcg tab, take 1 tablet by mouth once daily, Disp: , Rfl: 0    beclomethasone (QVAR) 80 mcg/actuation inhaler, Take 2 Puffs by inhalation two (2) times a day. Indications: BRONCHIAL ASTHMA, Disp: , Rfl:     levothyroxine (SYNTHROID) 50 mcg tablet, Take  by mouth Daily (before breakfast). Indications: HYPOTHYROIDISM, Disp: , Rfl:     amoxicillin-clavulanate (AUGMENTIN) 875-125 mg per tablet, take 1 tablet by mouth every 12 hours for 10 days, Disp: , Rfl: 0    albuterol (PROAIR HFA) 90 mcg/actuation inhaler, Take 2 Puffs by inhalation every four (4) hours as needed for Wheezing.  Indications: ACUTE ASTHMA ATTACK, Disp: , Rfl:     SOCIAL HISTORY:   Social History     Socioeconomic History    Marital status: SINGLE     Spouse name: Not on file    Number of children: Not on file    Years of education: Not on file    Highest education level: Not on file   Occupational History    Not on file   Social Needs    Financial resource strain: Not on file    Food insecurity:     Worry: Not on file     Inability: Not on file    Transportation needs:     Medical: Not on file     Non-medical: Not on file   Tobacco Use    Smoking status: Current Every Day Smoker     Packs/day: 1.00     Years: 3.00     Pack years: 3.00    Smokeless tobacco: Never Used   Substance and Sexual Activity    Alcohol use: Yes     Comment: occassional    Drug use: No    Sexual activity: Not on file   Lifestyle    Physical activity:     Days per week: Not on file     Minutes per session: Not on file    Stress: Not on file   Relationships    Social connections:     Talks on phone: Not on file     Gets together: Not on file     Attends Advent service: Not on file     Active member of club or organization: Not on file     Attends meetings of clubs or organizations: Not on file     Relationship status: Not on file    Intimate partner violence:     Fear of current or ex partner: Not on file     Emotionally abused: Not on file     Physically abused: Not on file     Forced sexual activity: Not on file   Other Topics Concern    Not on file   Social History Narrative    Not on file       FAMILY HISTORY:  Family History   Problem Relation Age of Onset    Arthritis-osteo Mother     Migraines Mother     Stroke Mother     Elevated Lipids Father     Alcohol abuse Maternal Uncle     Diabetes Maternal Grandmother     Cancer Paternal Grandmother        REVIEW OF SYSTEMS: Complete ROS assessed and noted for that which is described above, all else are negative. Eyes: normal  ENT: normal  CVS: normal  Resp: normal  GI: normal  : normal  GYN: normal  Endocrine: normal  Integument: normal  Musculoskeletal: normal  Neuro: normal  Psych: normal      PHYSICAL EXAMINATION:    VITAL SIGNS:  Visit Vitals  /88 (BP 1 Location: Left arm, BP Patient Position: Sitting)   Pulse 90   Ht 5' (1.524 m)   Wt 235 lb (106.6 kg)   BMI 45.90 kg/m²       GENERAL: NCAT, Sitting comfortably, NAD  EYES: EOMI, non-icteric, no proptosis  Ear/Nose/Throat: NCAT, no inflammation, no masses, thyroid gland is not appreciably enlarged  LYMPH NODES: No LAD  CARDIOVASCULAR: S1 S2, RRR, No murmur, 2+ radial pulses  RESPIRATORY: CTA b/l, no wheeze/rales  GASTROINTESTINAL: ND  MUSCULOSKELETAL: Normal ROM, no atrophy  SKIN: warm, no edema/rash/ or other skin changes  NEUROLOGIC: 5/5 power all extremities, no tremor, AAOx3  PSYCHIATRIC: Normal affect, Normal insight and judgement       REVIEW OF LABORATORY AND RADIOLOGY DATA:   Labs and documentation have been reviewed as described above.                  ASSESSMENT AND PLAN:   Yeyo Florentino is a 22 y.o. female with a PMHx as noted above who presents for f/u evaluation of hypothyroidism and thyroid nodule    Hypothyroidism  Thyroid nodule    Thyroid nodule/cyst  I reviewed the images of her thyroid US and included select images above. Her nodule appears very hypoechoic and a large component of this is likely fluid, as a cyst. It is not 100% anechoic which is likely why the radiologist also read it as \"nodule or cyst\", however appears benign and without suspicious features. Can repeat this in 3 years unless she has any symptoms or concerns. Hypothyroidism  Will obtain recent results from patients primary clinic,  Continue with 50 mcg of levothyroxine daily. Plan to RTC in 6 months with thyroid pre-labs,    25 minutes spent together with patient today of which >50% of this time was spent in counseling and coordination of care. Mine Yang.  [address] End[facility]

## 2019-08-27 NOTE — PATIENT INSTRUCTIONS
PLEASE READ THESE INSTRUCTIONS:     1. Plan to take 50 mcg of levothyroxine each morning. 2. Remember to take levothyroxine with a glass of water only, on an empty stomach each morning, 1 hour prior to ingesting any other medications, including vitamins, food, coffee, tea, juice or any other beverages. All of these can reduce the absorption of thyroid hormone tablets and result in fluctuating levels in the blood and on labs, affecting also how one feels. 3. We will plan for you to return to clinic in 6 months for re-evaluation,    4. I have provided you with a lab order sheet so you can have your labs repeated 2 days before your next visit. This way we can have the results available to us in time for your visit so we can make the best decisions at that time.

## 2019-08-28 ENCOUNTER — PATIENT MESSAGE (OUTPATIENT)
Dept: ENDOCRINOLOGY | Age: 24
End: 2019-08-28

## 2020-12-07 NOTE — ED NOTES
Digital Medicine: Health  Follow-Up    The history is provided by the patient.             Reason for review: Blood pressure at goal        Topics Covered on Call: physical activity and Diet    Additional Follow-up details: Average blood pressure today is 123/80. Blood pressure is stable and almost below goal.    She wasn't able to get her back surgery done and they rescheduled it for January.             Diet-no change to diet    No change to diet.  Patient reports eating or drinking the following: Denies changes to diet. She has still been working on lowering sodium, sugar and carbs. She is down to 189 pounds now and is really happy about this.       Physical Activity-no change to routine  No change to exercise routine.       Additional physical activity details: She still hasn't been able to exercise because of back pain. She is getting surgery in January.       Medication Adherence-Medication adherence was assessed.      Substance, Sleep, Stress-Not assessed      Continue current diet/physical activity routine.       Addressed patient questions and patient has my contact information if needed prior to next outreach. Patient verbalizes understanding.             There are no preventive care reminders to display for this patient.      Last 5 Patient Entered Readings                                      Current 30 Day Average: 123/80     Recent Readings 12/6/2020 12/6/2020 12/4/2020 12/4/2020 11/26/2020    SBP (mmHg) 119 125 131 118 134    DBP (mmHg) 76 80 80 79 83    Pulse 72 74 81 74 79                Shift report given to De Kulkarni RN. Patient resting comfortably in bed, call bell within reach.

## 2024-06-25 ENCOUNTER — OFFICE VISIT (OUTPATIENT)
Age: 29
End: 2024-06-25
Payer: MEDICAID

## 2024-06-25 VITALS
HEIGHT: 60 IN | WEIGHT: 255 LBS | OXYGEN SATURATION: 97 % | HEART RATE: 68 BPM | SYSTOLIC BLOOD PRESSURE: 119 MMHG | DIASTOLIC BLOOD PRESSURE: 77 MMHG | RESPIRATION RATE: 18 BRPM | BODY MASS INDEX: 50.06 KG/M2

## 2024-06-25 DIAGNOSIS — D72.829 LEUKOCYTOSIS, UNSPECIFIED TYPE: ICD-10-CM

## 2024-06-25 DIAGNOSIS — R63.5 WEIGHT GAIN: ICD-10-CM

## 2024-06-25 DIAGNOSIS — E04.1 THYROID NODULE: Primary | ICD-10-CM

## 2024-06-25 DIAGNOSIS — R23.3 EASY BRUISING: ICD-10-CM

## 2024-06-25 DIAGNOSIS — Z86.39 HISTORY OF HYPOTHYROIDISM: ICD-10-CM

## 2024-06-25 DIAGNOSIS — E04.1 THYROID NODULE: ICD-10-CM

## 2024-06-25 PROCEDURE — 99204 OFFICE O/P NEW MOD 45 MIN: CPT | Performed by: GENERAL ACUTE CARE HOSPITAL

## 2024-06-25 RX ORDER — ERGOCALCIFEROL 1.25 MG/1
50000 CAPSULE ORAL WEEKLY
Qty: 12 CAPSULE | Refills: 1 | Status: SHIPPED | OUTPATIENT
Start: 2024-06-25

## 2024-06-25 RX ORDER — ETONOGESTREL/ETHINYL ESTRADIOL .12-.015MG
RING, VAGINAL VAGINAL
COMMUNITY
Start: 2022-12-04

## 2024-06-25 RX ORDER — VALACYCLOVIR HYDROCHLORIDE 500 MG/1
500 TABLET, FILM COATED ORAL DAILY
COMMUNITY

## 2024-06-25 RX ORDER — BUSPIRONE HYDROCHLORIDE 15 MG/1
1 TABLET ORAL 2 TIMES DAILY
COMMUNITY

## 2024-06-25 RX ORDER — IBUPROFEN 600 MG/1
600 TABLET ORAL 3 TIMES DAILY PRN
COMMUNITY
Start: 2022-08-08

## 2024-06-25 RX ORDER — LAMOTRIGINE 200 MG/1
1 TABLET, EXTENDED RELEASE ORAL
COMMUNITY

## 2024-06-25 RX ORDER — FLUOXETINE HYDROCHLORIDE 20 MG/1
1 CAPSULE ORAL
COMMUNITY
Start: 2022-11-17

## 2024-06-25 RX ORDER — LURASIDONE HYDROCHLORIDE 40 MG/1
TABLET, FILM COATED ORAL
COMMUNITY

## 2024-06-25 RX ORDER — HYDROXYZINE HYDROCHLORIDE 10 MG/1
TABLET, FILM COATED ORAL
COMMUNITY

## 2024-06-25 RX ORDER — ALBUTEROL SULFATE 90 UG/1
2 AEROSOL, METERED RESPIRATORY (INHALATION) EVERY 4 HOURS PRN
COMMUNITY
Start: 2021-09-21

## 2024-06-25 RX ORDER — ATENOLOL 25 MG/1
1 TABLET ORAL
COMMUNITY
Start: 2024-01-31

## 2024-06-25 RX ORDER — BUTALBITAL, ACETAMINOPHEN AND CAFFEINE 50; 325; 40 MG/1; MG/1; MG/1
CAPSULE ORAL
COMMUNITY
Start: 2024-03-20

## 2024-06-25 NOTE — PATIENT INSTRUCTIONS
Plan to complete blood test today  Complete the neck ultrasound to evaluate the thyroid nodule    Salivary Cortisol Test for 2 consecutive nights:     Plan to use the salivary pads provided to collect salivary samples on 2 separate evenings.   - Samples should be collected around 11 PM at night.   - Be sure you are not dehydrated for this test, make sure to drink fluids through the day. But do not drink fluids the 30 minutes before the test.   - Night #1, uncover a pad and place it under your tongue for 5 minutes.  - After 5 minutes, place the cover back on the pad.  - Place a label provided on the pad cover, include the date and time collected on that sample.   - Place sample in the fridge  - Repeat this same procedure on the following evening at 11 PM.   - Take the samples out of the fridge when you are ready to take them to the laboratory.  - Have your lab sheet with you when you submit the samples to the lab.   - At that time, you can have any other labs submitted or collected as needed.

## 2024-06-25 NOTE — PROGRESS NOTES
Chief Complaint   Patient presents with    New Patient    Thyroid Problem        /77 (Site: Right Upper Arm, Position: Sitting, Cuff Size: Large Adult)   Pulse 68   Resp 18   Ht 1.524 m (5')   Wt 115.7 kg (255 lb)   SpO2 97%   BMI 49.80 kg/m²      1. Have you been to the ER, urgent care clinic since your last visit?  Hospitalized since your last visit?No    2. Have you seen or consulted any other health care providers outside of the [facility] since your last visit?  Include any pap smears or colon screening. Yes Where: PCP  Brennan Bryant MD and NP Ashli Carl  
her other med in the AM and not waiting before eating breakfast.    We noted her thyroid level was stable and had her continue 50 mcg daily,   She as been taking it correctly as directed.    Had labs completed outside, will reach out to obtain them,       Thyroid Nodule:    A diagnosis of thyroid nodule was noted to be on file,   8/1/19: Repeat Thyroid US: Left 1.1 x 0.8 x 0.3 cm \"nodule or cyst\",      Allergies   Allergen Reactions    Latex Hives and Rash    Adhesive Tape Hives and Rash       Review of Systems:  - Cardiovascular: no chest pain  - Neurological: no tremors  - Integumentary: skin is normal     Physical Examination:    /77 (Site: Right Upper Arm, Position: Sitting, Cuff Size: Large Adult)   Pulse 68   Resp 18   Ht 1.524 m (5')   Wt 115.7 kg (255 lb)   SpO2 97%   BMI 49.80 kg/m²   General: pleasant, no distress, good eye contact   Neck: no thyromegaly or lymphadenopathy  Cardiovascular: regular, normal rate, nl s1 and s2, no m/r/g   Integumentary: skin is normal, no edema  Neurological: reflexes 2+ at biceps, no tremors  Psychiatric: normal mood and affect    RTC = 3 months    Please note that this dictation was completed with [employer], the computer voice recognition software.  Quite often unanticipated grammatical, syntax, homophones, and other interpretive errors are inadvertently transcribed by the computer software.  Efforts were made to correct these errors in proofreading. Please excuse any errors that have escaped final proofreading.  Thank you.     Glenn Sevilla MD  [facility]     Patient Instructions   Plan to complete blood test today  Complete the neck ultrasound to evaluate the thyroid nodule    Salivary Cortisol Test for 2 consecutive nights:     Plan to use the salivary pads provided to collect salivary samples on 2 separate evenings.   - Samples should be collected around 11 PM at night.   - Be sure you are not dehydrated for this test, make sure to drink

## 2024-06-26 LAB
BASOPHILS # BLD: 0 K/UL (ref 0–0.1)
BASOPHILS NFR BLD: 0 % (ref 0–1)
CRP SERPL-MCNC: 3.33 MG/DL (ref 0–0.3)
DIFFERENTIAL METHOD BLD: ABNORMAL
EOSINOPHIL # BLD: 0.4 K/UL (ref 0–0.4)
EOSINOPHIL NFR BLD: 3 % (ref 0–7)
ERYTHROCYTE [DISTWIDTH] IN BLOOD BY AUTOMATED COUNT: 13.5 % (ref 11.5–14.5)
ERYTHROCYTE [SEDIMENTATION RATE] IN BLOOD: 17 MM/HR (ref 0–20)
HCT VFR BLD AUTO: 41.9 % (ref 35–47)
HGB BLD-MCNC: 13.5 G/DL (ref 11.5–16)
IMM GRANULOCYTES # BLD AUTO: 0 K/UL (ref 0–0.04)
IMM GRANULOCYTES NFR BLD AUTO: 0 % (ref 0–0.5)
LYMPHOCYTES # BLD: 2.6 K/UL (ref 0.8–3.5)
LYMPHOCYTES NFR BLD: 22 % (ref 12–49)
MCH RBC QN AUTO: 30.7 PG (ref 26–34)
MCHC RBC AUTO-ENTMCNC: 32.2 G/DL (ref 30–36.5)
MCV RBC AUTO: 95.2 FL (ref 80–99)
MONOCYTES # BLD: 0.9 K/UL (ref 0–1)
MONOCYTES NFR BLD: 8 % (ref 5–13)
NEUTS SEG # BLD: 7.8 K/UL (ref 1.8–8)
NEUTS SEG NFR BLD: 67 % (ref 32–75)
NRBC # BLD: 0 K/UL (ref 0–0.01)
NRBC BLD-RTO: 0 PER 100 WBC
PLATELET # BLD AUTO: 325 K/UL (ref 150–400)
PMV BLD AUTO: 11 FL (ref 8.9–12.9)
RBC # BLD AUTO: 4.4 M/UL (ref 3.8–5.2)
T4 FREE SERPL-MCNC: 1 NG/DL (ref 0.8–1.5)
TSH SERPL DL<=0.05 MIU/L-ACNC: 2.25 UIU/ML (ref 0.36–3.74)
WBC # BLD AUTO: 11.8 K/UL (ref 3.6–11)

## 2024-06-27 ENCOUNTER — TELEPHONE (OUTPATIENT)
Age: 29
End: 2024-06-27

## 2024-06-27 NOTE — TELEPHONE ENCOUNTER
Patient left  stating that the [facility] lab did not have the salivary cortisol test.  She is requesting a new order sent to [id number]. She can be reached at [phone] ([phone]).  Reordered through [facility].

## 2024-07-19 ENCOUNTER — PATIENT MESSAGE (OUTPATIENT)
Age: 29
End: 2024-07-19

## 2024-08-22 ENCOUNTER — HOSPITAL ENCOUNTER (OUTPATIENT)
Facility: HOSPITAL | Age: 29
Discharge: HOME | End: 2024-08-22
Attending: GENERAL ACUTE CARE HOSPITAL
Payer: MEDICAID

## 2024-08-22 DIAGNOSIS — E04.1 THYROID NODULE: ICD-10-CM

## 2024-08-22 PROCEDURE — 76536 US EXAM OF HEAD AND NECK: CPT

## 2025-05-05 ENCOUNTER — TELEMEDICINE (OUTPATIENT)
Age: 30
End: 2025-05-05
Payer: MEDICAID

## 2025-05-05 DIAGNOSIS — R68.89 COLD FEELING: ICD-10-CM

## 2025-05-05 DIAGNOSIS — Z86.39 HISTORY OF VITAMIN D DEFICIENCY: ICD-10-CM

## 2025-05-05 DIAGNOSIS — E04.1 THYROID NODULE: Primary | ICD-10-CM

## 2025-05-05 DIAGNOSIS — Z86.39 HISTORY OF HYPOTHYROIDISM: ICD-10-CM

## 2025-05-05 PROCEDURE — 99214 OFFICE O/P EST MOD 30 MIN: CPT | Performed by: GENERAL ACUTE CARE HOSPITAL

## 2025-05-05 RX ORDER — PANTOPRAZOLE SODIUM 20 MG/1
20 TABLET, DELAYED RELEASE ORAL DAILY
COMMUNITY
Start: 2025-04-13

## 2025-05-05 NOTE — PROGRESS NOTES
[facility]  DR HOUSTON MARTÍNEZ       The patient (or guardian, if applicable) and other individuals in attendance with the patient were advised that Artificial Intelligence will be utilized during this visit to record, process the conversation to generate a clinical note, and support improvement of the AI technology. The patient (or guardian, if applicable) and other individuals in attendance at the appointment consented to the use of AI, including the recording.         ASSESSMENT AND PLAN:    Sheyla Jones is a 30 y.o.  female with a PMHx as noted above who presents for f/u evaluation of hypothyroidism and thyroid nodule     History of hypothyroidism   Thyroid nodule, stable    Thyroid nodule: stable  - No new symptoms reported  - Ultrasound conducted in 2024 confirmed the stability of the thyroid nodule  - Reviewed records and confirmed that in the absence of any new changes, a repeat ultrasound is not deemed necessary at this time  - No further follow-up required unless new changes occur    Feeling cold   Fatigue  - Symptoms may be attributed to dietary factors, particularly protein intake, and hydration status  - Previous lab results indicate slightly low potassium and calcium levels, which could be related to her protein intake  - Hemoglobin level is 13.5, which is acceptable  - Discussion included the potential impact of her current medications, including beta blockers, lamotrigine, and Latuda, on her symptoms  - A1c level is within the normal range at 5.0  - Elevated white cell count likely due to stress following her fracture  - Advised to maintain a diet rich in protein and iron, ensure adequate hydration, and get sufficient rest  - Non-fasting blood test ordered at [facility] to assess thyroid function, iron and ferritin levels, basic metabolic panel, albumin level, and vitamin D level  - Results will be reviewed before leaving, and she will be informed if further action is

## 2025-05-16 LAB
25(OH)D3+25(OH)D2 SERPL-MCNC: 7.7 NG/ML (ref 30–100)
ALBUMIN SERPL-MCNC: 4.1 G/DL (ref 4–5)
BUN SERPL-MCNC: 6 MG/DL (ref 6–20)
BUN/CREAT SERPL: 12 (ref 9–23)
CALCIUM SERPL-MCNC: 8.9 MG/DL (ref 8.7–10.2)
CHLORIDE SERPL-SCNC: 99 MMOL/L (ref 96–106)
CO2 SERPL-SCNC: 15 MMOL/L (ref 20–29)
CREAT SERPL-MCNC: 0.51 MG/DL (ref 0.57–1)
EGFRCR SERPLBLD CKD-EPI 2021: 130 ML/MIN/1.73
FERRITIN SERPL-MCNC: 95 NG/ML (ref 15–150)
GLUCOSE SERPL-MCNC: 82 MG/DL (ref 70–99)
IRON SATN MFR SERPL: 20 % (ref 15–55)
IRON SERPL-MCNC: 71 UG/DL (ref 27–159)
POTASSIUM SERPL-SCNC: 3.9 MMOL/L (ref 3.5–5.2)
SODIUM SERPL-SCNC: 137 MMOL/L (ref 134–144)
T4 FREE SERPL-MCNC: 1.13 NG/DL (ref 0.82–1.77)
TIBC SERPL-MCNC: 361 UG/DL (ref 250–450)
TSH SERPL DL<=0.005 MIU/L-ACNC: 1.93 UIU/ML (ref 0.45–4.5)
UIBC SERPL-MCNC: 290 UG/DL (ref 131–425)

## 2025-05-31 ENCOUNTER — RESULTS FOLLOW-UP (OUTPATIENT)
Age: 30
End: 2025-05-31

## 2025-05-31 RX ORDER — ERGOCALCIFEROL 1.25 MG/1
50000 CAPSULE, LIQUID FILLED ORAL WEEKLY
Qty: 12 CAPSULE | Refills: 2 | Status: SHIPPED | OUTPATIENT
Start: 2025-05-31